# Patient Record
Sex: MALE | Race: WHITE | NOT HISPANIC OR LATINO | URBAN - METROPOLITAN AREA
[De-identification: names, ages, dates, MRNs, and addresses within clinical notes are randomized per-mention and may not be internally consistent; named-entity substitution may affect disease eponyms.]

---

## 2017-02-23 ENCOUNTER — EMERGENCY (EMERGENCY)
Facility: HOSPITAL | Age: 37
LOS: 0 days | Discharge: HOME | End: 2017-02-23
Attending: EMERGENCY MEDICINE
Payer: COMMERCIAL

## 2017-02-23 VITALS
DIASTOLIC BLOOD PRESSURE: 85 MMHG | HEART RATE: 117 BPM | SYSTOLIC BLOOD PRESSURE: 151 MMHG | HEIGHT: 69 IN | TEMPERATURE: 97 F | OXYGEN SATURATION: 99 % | WEIGHT: 203.05 LBS | RESPIRATION RATE: 18 BRPM

## 2017-02-23 DIAGNOSIS — E11.9 TYPE 2 DIABETES MELLITUS WITHOUT COMPLICATIONS: ICD-10-CM

## 2017-02-23 DIAGNOSIS — R10.9 UNSPECIFIED ABDOMINAL PAIN: ICD-10-CM

## 2017-02-23 DIAGNOSIS — M54.5 LOW BACK PAIN: ICD-10-CM

## 2017-02-23 DIAGNOSIS — K85.90 ACUTE PANCREATITIS WITHOUT NECROSIS OR INFECTION, UNSPECIFIED: ICD-10-CM

## 2017-02-23 LAB
APPEARANCE UR: CLEAR — SIGNIFICANT CHANGE UP
BILIRUB UR-MCNC: NEGATIVE — SIGNIFICANT CHANGE UP
COLOR SPEC: YELLOW — SIGNIFICANT CHANGE UP
DIFF PNL FLD: ABNORMAL
GLUCOSE UR QL: 1000 MG/DL
KETONES UR-MCNC: ABNORMAL
LEUKOCYTE ESTERASE UR-ACNC: NEGATIVE — SIGNIFICANT CHANGE UP
NITRITE UR-MCNC: NEGATIVE — SIGNIFICANT CHANGE UP
PH UR: 5 — SIGNIFICANT CHANGE UP (ref 4.8–8)
PROT UR-MCNC: 15 MG/DL
RBC CASTS # UR COMP ASSIST: ABNORMAL /HPF (ref 0–4)
SP GR SPEC: 1.01 — SIGNIFICANT CHANGE UP (ref 1.01–1.02)
UROBILINOGEN FLD QL: NEGATIVE MG/DL — SIGNIFICANT CHANGE UP
WBC UR QL: NEGATIVE — SIGNIFICANT CHANGE UP

## 2017-02-23 PROCEDURE — 99284 EMERGENCY DEPT VISIT MOD MDM: CPT | Mod: 25

## 2017-02-23 PROCEDURE — 74176 CT ABD & PELVIS W/O CONTRAST: CPT | Mod: 26

## 2017-02-23 RX ORDER — METFORMIN HYDROCHLORIDE 850 MG/1
1 TABLET ORAL
Qty: 20 | Refills: 0
Start: 2017-02-23 | End: 2017-03-05

## 2017-02-23 NOTE — ED PROVIDER NOTE - MEDICAL DECISION MAKING DETAILS
Patient with lumbago. Labs and imaging reviewed. Patient in good condition throughout ED course and now discharged with care instructions. Patient is to follow up with pmd. Discussed results and outcome of testing with the patient.  Patient advised to please follow up with their primary care doctor within the next 24 hours and return to the Emergency Department for worsening symptoms or any other concerns.  Patient advised that their doctor may call  to follow up on the specific results of the tests performed today in the emergency department. Patient with lumbago. Labs and imaging reviewed. Patient in good condition throughout ED course and now discharged with care instructions. He reports running out of metformin thus provided for him. Patient is to follow up with pmd. Discussed results and outcome of testing with the patient.  Patient advised to please follow up with their primary care doctor within the next 24 hours and return to the Emergency Department for worsening symptoms or any other concerns.  Patient advised that their doctor may call  to follow up on the specific results of the tests performed today in the emergency department.

## 2017-02-23 NOTE — ED ADULT TRIAGE NOTE - CHIEF COMPLAINT QUOTE
I started to urinate every hour this morning and then afterwards I would feel like pressure in the region of my kidneys.  im not in any pain at all.  I just kind of feel like my kidneys are inflammed.  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

## 2017-02-24 LAB
CULTURE RESULTS: NO GROWTH — SIGNIFICANT CHANGE UP
SPECIMEN SOURCE: SIGNIFICANT CHANGE UP

## 2021-01-02 DIAGNOSIS — Z20.828 CONTACT WITH AND (SUSPECTED) EXPOSURE TO OTHER VIRAL COMMUNICABLE DISEASES: ICD-10-CM

## 2021-11-05 ENCOUNTER — EMERGENCY (EMERGENCY)
Facility: HOSPITAL | Age: 41
LOS: 0 days | Discharge: HOME | End: 2021-11-05
Attending: STUDENT IN AN ORGANIZED HEALTH CARE EDUCATION/TRAINING PROGRAM
Payer: MEDICAID

## 2021-11-05 VITALS
RESPIRATION RATE: 16 BRPM | TEMPERATURE: 98 F | HEIGHT: 69 IN | OXYGEN SATURATION: 97 % | WEIGHT: 227.96 LBS | SYSTOLIC BLOOD PRESSURE: 138 MMHG | HEART RATE: 124 BPM | DIASTOLIC BLOOD PRESSURE: 85 MMHG

## 2021-11-05 VITALS
TEMPERATURE: 98 F | OXYGEN SATURATION: 99 % | DIASTOLIC BLOOD PRESSURE: 84 MMHG | SYSTOLIC BLOOD PRESSURE: 126 MMHG | HEART RATE: 91 BPM | RESPIRATION RATE: 18 BRPM

## 2021-11-05 DIAGNOSIS — J44.9 CHRONIC OBSTRUCTIVE PULMONARY DISEASE, UNSPECIFIED: ICD-10-CM

## 2021-11-05 DIAGNOSIS — R07.89 OTHER CHEST PAIN: ICD-10-CM

## 2021-11-05 DIAGNOSIS — F32.9 MAJOR DEPRESSIVE DISORDER, SINGLE EPISODE, UNSPECIFIED: ICD-10-CM

## 2021-11-05 DIAGNOSIS — E11.9 TYPE 2 DIABETES MELLITUS WITHOUT COMPLICATIONS: ICD-10-CM

## 2021-11-05 DIAGNOSIS — Z87.891 PERSONAL HISTORY OF NICOTINE DEPENDENCE: ICD-10-CM

## 2021-11-05 PROBLEM — K85.90 ACUTE PANCREATITIS WITHOUT NECROSIS OR INFECTION, UNSPECIFIED: Chronic | Status: ACTIVE | Noted: 2017-02-23

## 2021-11-05 LAB
ALBUMIN SERPL ELPH-MCNC: 4.2 G/DL — SIGNIFICANT CHANGE UP (ref 3.3–5)
ALP SERPL-CCNC: 74 U/L — SIGNIFICANT CHANGE UP (ref 40–120)
ALT FLD-CCNC: 30 U/L — SIGNIFICANT CHANGE UP (ref 12–78)
ANION GAP SERPL CALC-SCNC: 8 MMOL/L — SIGNIFICANT CHANGE UP (ref 5–17)
AST SERPL-CCNC: 9 U/L — LOW (ref 15–37)
BASOPHILS # BLD AUTO: 0.06 K/UL — SIGNIFICANT CHANGE UP (ref 0–0.2)
BASOPHILS NFR BLD AUTO: 0.4 % — SIGNIFICANT CHANGE UP (ref 0–2)
BILIRUB SERPL-MCNC: 0.6 MG/DL — SIGNIFICANT CHANGE UP (ref 0.2–1.2)
BUN SERPL-MCNC: 18 MG/DL — SIGNIFICANT CHANGE UP (ref 7–23)
CALCIUM SERPL-MCNC: 9.4 MG/DL — SIGNIFICANT CHANGE UP (ref 8.5–10.1)
CHLORIDE SERPL-SCNC: 101 MMOL/L — SIGNIFICANT CHANGE UP (ref 96–108)
CO2 SERPL-SCNC: 26 MMOL/L — SIGNIFICANT CHANGE UP (ref 22–31)
CREAT SERPL-MCNC: 0.72 MG/DL — SIGNIFICANT CHANGE UP (ref 0.5–1.3)
EOSINOPHIL # BLD AUTO: 0.04 K/UL — SIGNIFICANT CHANGE UP (ref 0–0.5)
EOSINOPHIL NFR BLD AUTO: 0.3 % — SIGNIFICANT CHANGE UP (ref 0–6)
FLUAV AG NPH QL: SIGNIFICANT CHANGE UP
FLUBV AG NPH QL: SIGNIFICANT CHANGE UP
GLUCOSE SERPL-MCNC: 208 MG/DL — HIGH (ref 70–99)
HCT VFR BLD CALC: 45.1 % — SIGNIFICANT CHANGE UP (ref 39–50)
HGB BLD-MCNC: 15.6 G/DL — SIGNIFICANT CHANGE UP (ref 13–17)
IMM GRANULOCYTES NFR BLD AUTO: 0.4 % — SIGNIFICANT CHANGE UP (ref 0–1.5)
LYMPHOCYTES # BLD AUTO: 20.2 % — SIGNIFICANT CHANGE UP (ref 13–44)
LYMPHOCYTES # BLD AUTO: 3.11 K/UL — SIGNIFICANT CHANGE UP (ref 1–3.3)
MCHC RBC-ENTMCNC: 27.6 PG — SIGNIFICANT CHANGE UP (ref 27–34)
MCHC RBC-ENTMCNC: 34.6 GM/DL — SIGNIFICANT CHANGE UP (ref 32–36)
MCV RBC AUTO: 79.8 FL — LOW (ref 80–100)
MONOCYTES # BLD AUTO: 0.94 K/UL — HIGH (ref 0–0.9)
MONOCYTES NFR BLD AUTO: 6.1 % — SIGNIFICANT CHANGE UP (ref 2–14)
NEUTROPHILS # BLD AUTO: 11.19 K/UL — HIGH (ref 1.8–7.4)
NEUTROPHILS NFR BLD AUTO: 72.6 % — SIGNIFICANT CHANGE UP (ref 43–77)
NRBC # BLD: 0 /100 WBCS — SIGNIFICANT CHANGE UP (ref 0–0)
PLATELET # BLD AUTO: 291 K/UL — SIGNIFICANT CHANGE UP (ref 150–400)
POTASSIUM SERPL-MCNC: 3.7 MMOL/L — SIGNIFICANT CHANGE UP (ref 3.5–5.3)
POTASSIUM SERPL-SCNC: 3.7 MMOL/L — SIGNIFICANT CHANGE UP (ref 3.5–5.3)
PROT SERPL-MCNC: 8.2 GM/DL — SIGNIFICANT CHANGE UP (ref 6–8.3)
RBC # BLD: 5.65 M/UL — SIGNIFICANT CHANGE UP (ref 4.2–5.8)
RBC # FLD: 12.5 % — SIGNIFICANT CHANGE UP (ref 10.3–14.5)
SARS-COV-2 RNA SPEC QL NAA+PROBE: SIGNIFICANT CHANGE UP
SODIUM SERPL-SCNC: 135 MMOL/L — SIGNIFICANT CHANGE UP (ref 135–145)
TROPONIN I, HIGH SENSITIVITY RESULT: 5.5 NG/L — SIGNIFICANT CHANGE UP
TROPONIN I, HIGH SENSITIVITY RESULT: 5.7 NG/L — SIGNIFICANT CHANGE UP
WBC # BLD: 15.4 K/UL — HIGH (ref 3.8–10.5)
WBC # FLD AUTO: 15.4 K/UL — HIGH (ref 3.8–10.5)

## 2021-11-05 PROCEDURE — 99285 EMERGENCY DEPT VISIT HI MDM: CPT

## 2021-11-05 PROCEDURE — 71046 X-RAY EXAM CHEST 2 VIEWS: CPT | Mod: 26

## 2021-11-05 PROCEDURE — 93010 ELECTROCARDIOGRAM REPORT: CPT

## 2021-11-05 NOTE — ED ADULT TRIAGE NOTE - CHIEF COMPLAINT QUOTE
pt states he has anxiety Dad  2 months ago and unemployed etc c/o today of left chest discomfort after stopping smoking 5 days ago

## 2021-11-05 NOTE — ED PROVIDER NOTE - OBJECTIVE STATEMENT
40 yo male with PMH DM, COPD presents to ED for evaluation chest pain. Pain is left sided, tightness, radiating to L jaw. States onset of pain intermittent since 2 months ago when father , quit his job. Reports he was going through severe depression, did not seek medical attention. States earlier this week, began to feel more motivated to self care, saw a PCP, was prescribed Lexapro. Denies shortness of breath, nausea, vomiting, diarrhea.   Psx: none  Allergies: none  Has been vaccinated for covid. Denies recent travel or sick contacts.   Former smoking, none EtOH use,+ THC

## 2021-11-05 NOTE — ED PROVIDER NOTE - NSFOLLOWUPINSTRUCTIONS_ED_ALL_ED_FT
Please return to Emergency Department immediately for any new, concerning, or worsening symptoms.   Please follow-up with Cardiology as recommended.    Please take prescriptions as discussed.

## 2021-11-05 NOTE — ED ADULT NURSE NOTE - DOES PATIENT HAVE ADVANCE DIRECTIVE
----- Message from Hilary Salgado sent at 2/13/2017  1:27 PM EST -----  Contact: PATIENT  RE: CIALIS 5 MG    PATIENT CALLED STATING HIS INSURANCE HAS CHANGED AND THAT HE NOW NEEDS A PA FOR CIALIS. HE HAS REFILLS REMAINING BUT CAN NOT PICK THEM UP PER INSURANCE NEEDING A PA.    [facility] IN [place], [place]    CALL BACK #[phone]   No

## 2021-11-05 NOTE — ED PROVIDER NOTE - NSICDXFAMILYHX_GEN_ALL_CORE_FT
FAMILY HISTORY:  No pertinent family history in first degree relatives     Star Wedge Flap Text: The defect edges were debeveled with a #15 scalpel blade.  Given the location of the defect, shape of the defect and the proximity to free margins a star wedge flap was deemed most appropriate.  Using a sterile surgical marker, an appropriate rotation flap was drawn incorporating the defect and placing the expected incisions within the relaxed skin tension lines where possible. The area thus outlined was incised deep to adipose tissue with a #15 scalpel blade.  The skin margins were undermined to an appropriate distance in all directions utilizing iris scissors.

## 2021-11-05 NOTE — ED PROVIDER NOTE - PATIENT PORTAL LINK FT
You can access the FollowMyHealth Patient Portal offered by [facility] by registering at the following website: [email or web address]. By joining [employer]’s FollowMyHealth portal, you will also be able to view your health information using other applications (apps) compatible with our system.

## 2021-11-05 NOTE — ED ADULT NURSE NOTE - OBJECTIVE STATEMENT
PT presented to ER, aOX4 and ambulatory, with complaints of left sided chest tightness. pt states his father passed away two months ago due to COVID, lost his job and has been overly stressed out. Pt admits to quitting marijuana five days ago and being evaluated by PMD for anxiety. PT states that he was prescribed medication for anxiety but unable

## 2021-11-05 NOTE — ED PROVIDER NOTE - NS ED ROS FT
Constitutional: no fevers or chills  Cardiac: no palpitations, +chest pain  Lungs: no shortness of breath, wheezes  Abd: no abd pain, nausea, vomiting, diarrhea  Genitourinary: no dysuria, increased urinary frequency, hematuria  Neurology: no sensorimotor deficits, no dizziness, no headache, no visual changes  Skin: no rashes  All other ROS negative except as per HPI

## 2023-05-16 ENCOUNTER — APPOINTMENT (OUTPATIENT)
Dept: ORTHOPEDIC SURGERY | Facility: CLINIC | Age: 43
End: 2023-05-16
Payer: MEDICAID

## 2023-05-16 VITALS — BODY MASS INDEX: 32.14 KG/M2 | HEIGHT: 69 IN | WEIGHT: 217 LBS

## 2023-05-16 DIAGNOSIS — I10 ESSENTIAL (PRIMARY) HYPERTENSION: ICD-10-CM

## 2023-05-16 DIAGNOSIS — S16.1XXA STRAIN OF MUSCLE, FASCIA AND TENDON AT NECK LEVEL, INITIAL ENCOUNTER: ICD-10-CM

## 2023-05-16 DIAGNOSIS — Z87.891 PERSONAL HISTORY OF NICOTINE DEPENDENCE: ICD-10-CM

## 2023-05-16 DIAGNOSIS — E78.00 PURE HYPERCHOLESTEROLEMIA, UNSPECIFIED: ICD-10-CM

## 2023-05-16 DIAGNOSIS — E11.9 TYPE 2 DIABETES MELLITUS W/OUT COMPLICATIONS: ICD-10-CM

## 2023-05-16 DIAGNOSIS — M25.812 OTHER SPECIFIED JOINT DISORDERS, LEFT SHOULDER: ICD-10-CM

## 2023-05-16 PROCEDURE — 99204 OFFICE O/P NEW MOD 45 MIN: CPT

## 2023-05-16 PROCEDURE — 72040 X-RAY EXAM NECK SPINE 2-3 VW: CPT

## 2023-05-16 PROCEDURE — 73030 X-RAY EXAM OF SHOULDER: CPT | Mod: LT

## 2023-05-16 RX ORDER — INSULIN GLARGINE 100 [IU]/ML
INJECTION, SOLUTION SUBCUTANEOUS
Refills: 0 | Status: ACTIVE | COMMUNITY

## 2023-05-16 RX ORDER — LOSARTAN POTASSIUM 50 MG/1
50 TABLET, FILM COATED ORAL
Refills: 0 | Status: ACTIVE | COMMUNITY

## 2023-05-16 RX ORDER — ATORVASTATIN CALCIUM 40 MG/1
40 TABLET, FILM COATED ORAL
Refills: 0 | Status: ACTIVE | COMMUNITY

## 2023-05-16 RX ORDER — SITAGLIPTIN AND METFORMIN HYDROCHLORIDE 50; 1000 MG/1; MG/1
50-1000 TABLET, FILM COATED, EXTENDED RELEASE ORAL
Refills: 0 | Status: ACTIVE | COMMUNITY

## 2023-05-16 NOTE — IMAGING
[de-identified] : Left Shoulder Exam: ER at 0 is 45 degrees. GH abduction is 75 degrees. Adduction range is full. Neer's test is negative. There is no internal impingement sign. IR range is thumb to mid thoracic with substantial pain. There is a positive external impingement sign. The forearm rotates to 10 degrees short of vertical. ER strength is excellent. IR strength is excellent. Empty can test reveals excellent strength with slight pain.\par \par Cervical Spine Exam: There is full AROM of all modes of motion. There is no pain at the extremes. DTRs are triceps 1+ equal, biceps 0 equal. No tenderness over the left occipital muscle. There is slight tenderness over the SCM 3 centimeters distal from the insertion on the mastoid. \par \par Left Shoulder X-Rays: There is a type 1-2 acromion. Slight DJD of the GH joint. No superior migration. \par \par Cervical Spine X-Rays:  There is partial straightening of lordosis. Advanced narrowing of all discs with early reactive change. There is minimal facet arthrosis. No lesions or fractures.

## 2023-05-16 NOTE — HISTORY OF PRESENT ILLNESS
[FreeTextEntry1] : Left Shoulder  [de-identified] : 05/16/2023: Left shoulder\par Pt is RHD. Pt reports that he has been experiencing left shoulder pain for 8 years, and has previously gotten a CSI to the shoulder which helped his pain and lasted for about 6 months. He states that he has a hx of diabetes and in the past had not taken care of his sugar levels, which he has started to attend to recently. Since monitoring and managing his sugars he states that his pain has greatly reduced but not entirely resolved. He reports that he started to experience neck pain on and off throughout the years alongside the shoulder pain. He makes mention that when placing his left hand on his head, the pain intensity reduces. He has pain when extending his arm backwards. He also makes note of weakness present in the left arm- he states that he cannot lift a gallon of milk from the top shelf of his fridge without supporting the arm.  [FreeTextEntry5] : Pt denies injury/accident.  PT states he has left shoulder pain for over 8 years.  PT states he received cortisone injections from his primary care physician and it helped.  The last cortisone shot helped for 6 months which was given 10/01/2022.

## 2023-05-16 NOTE — DISCUSSION/SUMMARY
[de-identified] : 1) I discussed the risks, benefits and alternatives of treatment options for the cervical spine and left shoulder, including activity modification, rest, home exercise, oral antiinflammatory medication, physical therapy, CSI. \par 2) **Rx physical therapy for the left shoulder for AROM restoration.\par 3) Pt will continue with activity modification and home exercise as tolerated.  The patient should avoid exercise and activity that aggravates pain. \par \par \par The patient will continue with conservative treatment as described above, and will F/U in 6 weeks. \par \par \par The patient was advised of the diagnosis.  The natural history of the pathology was explained in full to the patient in layman's terms, including but not limited to the risks, symptoms and available options for treatment.  We discussed the risks, benefits and alternatives of the treatment options and the advice I provided to the patient as listed above.  Pt was given the opportunity to ask questions, and all questions were answered.  The discussion was not limited to the above.\par \par Entered by Alicia Vergara acting as scribe.

## 2023-06-27 ENCOUNTER — APPOINTMENT (OUTPATIENT)
Dept: ORTHOPEDIC SURGERY | Facility: CLINIC | Age: 43
End: 2023-06-27

## 2024-02-23 NOTE — ED ADULT NURSE NOTE - GASTROINTESTINAL WDL
Abdomen soft, nontender, nondistended, bowel sounds present in all 4 quadrants.
Detail Level: Zone
Topical Steroids Counseling: I discussed with the patient that prolonged use of topical steroids can result in the increased appearance of superficial blood vessels (telangiectasias), lightening (hypopigmentation) and thinning of the skin (atrophy).  Patient understands to avoid using high potency steroids in skin folds, the groin or the face.  The patient verbalized understanding of the proper use and possible adverse effects of topical steroids.  All of the patient's questions and concerns were addressed.

## 2024-04-27 ENCOUNTER — EMERGENCY (EMERGENCY)
Facility: HOSPITAL | Age: 44
LOS: 0 days | Discharge: HOME | End: 2024-04-27
Attending: STUDENT IN AN ORGANIZED HEALTH CARE EDUCATION/TRAINING PROGRAM
Payer: MEDICAID

## 2024-04-27 VITALS
TEMPERATURE: 98 F | RESPIRATION RATE: 19 BRPM | HEIGHT: 69 IN | HEART RATE: 101 BPM | WEIGHT: 235.01 LBS | SYSTOLIC BLOOD PRESSURE: 183 MMHG | OXYGEN SATURATION: 98 % | DIASTOLIC BLOOD PRESSURE: 80 MMHG

## 2024-04-27 DIAGNOSIS — R07.89 OTHER CHEST PAIN: ICD-10-CM

## 2024-04-27 DIAGNOSIS — E11.9 TYPE 2 DIABETES MELLITUS WITHOUT COMPLICATIONS: ICD-10-CM

## 2024-04-27 DIAGNOSIS — I10 ESSENTIAL (PRIMARY) HYPERTENSION: ICD-10-CM

## 2024-04-27 DIAGNOSIS — F41.9 ANXIETY DISORDER, UNSPECIFIED: ICD-10-CM

## 2024-04-27 DIAGNOSIS — Z87.891 PERSONAL HISTORY OF NICOTINE DEPENDENCE: ICD-10-CM

## 2024-04-27 DIAGNOSIS — E78.5 HYPERLIPIDEMIA, UNSPECIFIED: ICD-10-CM

## 2024-04-27 LAB
ALBUMIN SERPL ELPH-MCNC: 3.8 G/DL — SIGNIFICANT CHANGE UP (ref 3.3–5)
ALP SERPL-CCNC: 67 U/L — SIGNIFICANT CHANGE UP (ref 40–120)
ALT FLD-CCNC: 34 U/L — SIGNIFICANT CHANGE UP (ref 12–78)
ANION GAP SERPL CALC-SCNC: 10 MMOL/L — SIGNIFICANT CHANGE UP (ref 5–17)
APTT BLD: 43.6 SEC — HIGH (ref 24.5–35.6)
AST SERPL-CCNC: 17 U/L — SIGNIFICANT CHANGE UP (ref 15–37)
BASOPHILS # BLD AUTO: 0.04 K/UL — SIGNIFICANT CHANGE UP (ref 0–0.2)
BASOPHILS NFR BLD AUTO: 0.4 % — SIGNIFICANT CHANGE UP (ref 0–2)
BILIRUB SERPL-MCNC: 0.5 MG/DL — SIGNIFICANT CHANGE UP (ref 0.2–1.2)
BUN SERPL-MCNC: 19 MG/DL — SIGNIFICANT CHANGE UP (ref 7–23)
CALCIUM SERPL-MCNC: 9.2 MG/DL — SIGNIFICANT CHANGE UP (ref 8.5–10.1)
CHLORIDE SERPL-SCNC: 103 MMOL/L — SIGNIFICANT CHANGE UP (ref 96–108)
CO2 SERPL-SCNC: 25 MMOL/L — SIGNIFICANT CHANGE UP (ref 22–31)
CREAT SERPL-MCNC: 0.72 MG/DL — SIGNIFICANT CHANGE UP (ref 0.5–1.3)
EGFR: 116 ML/MIN/1.73M2 — SIGNIFICANT CHANGE UP
EOSINOPHIL # BLD AUTO: 0.05 K/UL — SIGNIFICANT CHANGE UP (ref 0–0.5)
EOSINOPHIL NFR BLD AUTO: 0.5 % — SIGNIFICANT CHANGE UP (ref 0–6)
GLUCOSE SERPL-MCNC: 120 MG/DL — HIGH (ref 70–99)
HCT VFR BLD CALC: 47 % — SIGNIFICANT CHANGE UP (ref 39–50)
HGB BLD-MCNC: 16 G/DL — SIGNIFICANT CHANGE UP (ref 13–17)
IMM GRANULOCYTES NFR BLD AUTO: 0.6 % — SIGNIFICANT CHANGE UP (ref 0–0.9)
INR BLD: 0.86 RATIO — SIGNIFICANT CHANGE UP (ref 0.85–1.18)
LIDOCAIN IGE QN: 38 U/L — SIGNIFICANT CHANGE UP (ref 13–75)
LYMPHOCYTES # BLD AUTO: 2.66 K/UL — SIGNIFICANT CHANGE UP (ref 1–3.3)
LYMPHOCYTES # BLD AUTO: 26.2 % — SIGNIFICANT CHANGE UP (ref 13–44)
MAGNESIUM SERPL-MCNC: 2.1 MG/DL — SIGNIFICANT CHANGE UP (ref 1.6–2.6)
MCHC RBC-ENTMCNC: 27.1 PG — SIGNIFICANT CHANGE UP (ref 27–34)
MCHC RBC-ENTMCNC: 34 G/DL — SIGNIFICANT CHANGE UP (ref 32–36)
MCV RBC AUTO: 79.5 FL — LOW (ref 80–100)
MONOCYTES # BLD AUTO: 0.69 K/UL — SIGNIFICANT CHANGE UP (ref 0–0.9)
MONOCYTES NFR BLD AUTO: 6.8 % — SIGNIFICANT CHANGE UP (ref 2–14)
NEUTROPHILS # BLD AUTO: 6.67 K/UL — SIGNIFICANT CHANGE UP (ref 1.8–7.4)
NEUTROPHILS NFR BLD AUTO: 65.5 % — SIGNIFICANT CHANGE UP (ref 43–77)
NRBC # BLD: 0 /100 WBCS — SIGNIFICANT CHANGE UP (ref 0–0)
NT-PROBNP SERPL-SCNC: 16 PG/ML — SIGNIFICANT CHANGE UP (ref 0–125)
PLATELET # BLD AUTO: 221 K/UL — SIGNIFICANT CHANGE UP (ref 150–400)
POTASSIUM SERPL-MCNC: 3.4 MMOL/L — LOW (ref 3.5–5.3)
POTASSIUM SERPL-SCNC: 3.4 MMOL/L — LOW (ref 3.5–5.3)
PROT SERPL-MCNC: 7.9 GM/DL — SIGNIFICANT CHANGE UP (ref 6–8.3)
PROTHROM AB SERPL-ACNC: 10.4 SEC — SIGNIFICANT CHANGE UP (ref 9.5–13)
RBC # BLD: 5.91 M/UL — HIGH (ref 4.2–5.8)
RBC # FLD: 13 % — SIGNIFICANT CHANGE UP (ref 10.3–14.5)
SODIUM SERPL-SCNC: 138 MMOL/L — SIGNIFICANT CHANGE UP (ref 135–145)
TROPONIN I, HIGH SENSITIVITY RESULT: 4.4 NG/L — SIGNIFICANT CHANGE UP
WBC # BLD: 10.17 K/UL — SIGNIFICANT CHANGE UP (ref 3.8–10.5)
WBC # FLD AUTO: 10.17 K/UL — SIGNIFICANT CHANGE UP (ref 3.8–10.5)

## 2024-04-27 PROCEDURE — 71046 X-RAY EXAM CHEST 2 VIEWS: CPT | Mod: 26

## 2024-04-27 PROCEDURE — 93010 ELECTROCARDIOGRAM REPORT: CPT

## 2024-04-27 PROCEDURE — 99285 EMERGENCY DEPT VISIT HI MDM: CPT

## 2024-04-27 NOTE — ED ADULT TRIAGE NOTE - CHIEF COMPLAINT QUOTE
Tightness in chest today- On and off past fews weeks. Also c/o heart racing- checks himself at home  HX anxiety, DM, quit edibles last month

## 2024-04-27 NOTE — ED PROVIDER NOTE - NSICDXPASTMEDICALHX_GEN_ALL_CORE_FT
PAST MEDICAL HISTORY:  Diabetes     HLD (hyperlipidemia)     HTN (hypertension)     Pancreatitis

## 2024-04-27 NOTE — ED PROVIDER NOTE - PHYSICAL EXAMINATION
Gen: aox3, nad   Head: NCAT  ENT: Airway patent, moist mucous membranes, nasal passageways clear  Cardiac: Normal rate, normal rhythm   Respiratory: Lungs CTA B/L  Gastrointestinal: Abdomen soft, nontender, nondistended, no rebound, no guarding  MSK: No gross abnormalities, FROM of all four extremities, no edema  HEME: Extremities warm, radial pulses intact and symmetrical   Skin: No rashes, no lesions  Neuro: No gross neurologic deficits, normal speech, no gait abnormality

## 2024-04-27 NOTE — ED PROVIDER NOTE - CLINICAL SUMMARY MEDICAL DECISION MAKING FREE TEXT BOX
44M pmhx dm, htn, hld, anxiety, former marijuana use, who presents today for left localized anterior chest tightness with associated palpitations/ heart racing sensation - lasting approx 2-3 minutes while he was seated in a room where others were smoking, completely self resolved without active symptoms   -  no exertional chest pain, no associated sob, lungs clear on exam, cxr ordered to rule out effusion, masses, consolidation, pneumothorax   - ekg with st depressions but when compared to ekg from 2021 it is unchanged, pt also without any chest pain - check troponin eval for nstemi, labs eval for anemia/ electrolyte derangements   - discussed w/ pt repeat serial troponins and re-eval but pt does not want to stay longer in the ED, shared decision making had and pt would like to be discharged. Initial troponin negative, atypical chest pain, pt given strict return precautions and advised urgent outpatient cardiology follow up

## 2024-04-27 NOTE — ED PROVIDER NOTE - NSFOLLOWUPINSTRUCTIONS_ED_ALL_ED_FT
You were sen today for chest pain - you did not have abnormal heart enzyme but you did have an abnormal EKG - this is similar to the one from 2021 but you should follow up urgently with cardiology and return for any new chest pain     Chest Pain    Chest pain can be caused by many different conditions which may or may not be dangerous. Causes include heartburn, lung infections, heart attack, blood clot in lungs, skin infections, strain or damage to muscle, cartilage, or bones, etc. In addition to a history and physical examination, an electrocardiogram (ECG) or other lab tests may have been performed to determine the cause of your chest pain. Follow up with your primary care provider or with a cardiologist as instructed.     SEEK IMMEDIATE MEDICAL CARE IF YOU HAVE ANY OF THE FOLLOWING SYMPTOMS: worsening chest pain, coughing up blood, unexplained back/neck/jaw pain, severe abdominal pain, dizziness or lightheadedness, fainting, shortness of breath, sweaty or clammy skin, vomiting, or racing heart beat. These symptoms may represent a serious problem that is an emergency. Do not wait to see if the symptoms will go away. Get medical help right away. Call 911 and do not drive yourself to the hospital.

## 2024-04-27 NOTE — ED PROVIDER NOTE - CARE PROVIDER_API CALL
Nish Peters  Interventional Cardiology  [address] [place] [address]  Phone: [phone]  Fax: [phone]  Follow Up Time:

## 2024-04-27 NOTE — ED PROVIDER NOTE - PATIENT PORTAL LINK FT
You can access the FollowMyHealth Patient Portal offered by [facility] by registering at the following website: [email or web address]. By joining [employer]’s FollowMyHealth portal, you will also be able to view your health information using other applications (apps) compatible with our system.

## 2024-04-27 NOTE — ED ADULT NURSE NOTE - OBJECTIVE STATEMENT
pt presents to the ed c/o chest discomfrot. States that for the past few weeks he's been havung intermittent chest tightness. Denies spb/ chest pain, n/v, headache, dizziness. States that he's really stressed out at home and at work and feels that may be triggering. 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 HX HTn, DM, HLD, compliant with meds

## 2024-04-27 NOTE — ED PROVIDER NOTE - DISCHARGE DATE
Quality 431: Preventive Care And Screening: Unhealthy Alcohol Use - Screening: Patient not identified as an unhealthy alcohol user when screened for unhealthy alcohol use using a systematic screening method Quality 130: Documentation Of Current Medications In The Medical Record: Current Medications Documented Detail Level: Detailed Quality 226: Preventive Care And Screening: Tobacco Use: Screening And Cessation Intervention: Patient screened for tobacco use and is an ex/non-smoker 27-Apr-2024

## 2024-04-28 ENCOUNTER — TRANSCRIPTION ENCOUNTER (OUTPATIENT)
Age: 44
End: 2024-04-28

## 2024-07-11 NOTE — ED PROVIDER NOTE - OBJECTIVE STATEMENT
Abhijit describes a subacute change in bowel habits over the past 8 months.  He is not having fevers or chills, losing weight or having blood in his stool.  That is reassuring.  His symptoms do correlate with increased alcohol intake, he is going to begin to curb his drinking.  He is going to limit drinking to 1 to 2 days/week and only 1-2 drinks per day.  Comprehensive metabolic panel, CBC, tissue transglutaminase antibodies will be included with his blood work.  He is going to observe a strict lactose-free diet.  Colon cancer screening is due now, and a colonoscopy was ordered.  He will call if his symptoms do not respond to diet changes.   Pertinent PMH/PSH/FHx/SHx and Review of Systems contained within:  37 yo m with PMH of DM presents in ED c/o lower back pain associated with frequent urination. Patient reports he is  and is sitting in uncomfortable position for long periods of time. No fever/chills, No photophobia/eye pain/changes in vision, No ear pain/sore throat/dysphagia, No chest pain/palpitations, no SOB/cough/wheeze/stridor, No abdominal pain, No N/V/D, no dysuria/frequency/discharge, No neck pain, no rash, no changes in neurological status/function.

## 2024-12-01 NOTE — ED PROVIDER NOTE - INTERNATIONAL TRAVEL
Today's date 12/1/2024  Admission date 11/20/2024  Hospital Room /A   Referring Provider: Yamini White NP     Reason for initial visit:  Evaluation and management of Diabetes Mellitus - type II, uncontrolled     History of Present Illness:  Current history is provided by The patient and The chart  This is a 59 year old Male who is admitted for CABG 11/27/2024.  He received 4 mg of Decadron IntraOp.    Interval History/ROS:  Appetite is fair   Some nausea.       Current hospital regimen for diabetes: Humalog 16 units post meals while on insulin infusion and eating > 50% of his meals.   Lantus 40 units daily  Insulin infusion with current rate at 0.8 units/hr.    --> of note, prior to CABG, he was managed with Jardiance 10 mg daily, Lantus 40 units daily, and sliding scale per primary team    Current oral intake status Room Service Continuous  Consistent Carb Moderate (45-75 Gm/Meal); Fluid Restrict 1800ml (1020 From Dietary) Diet    Blood sugars while hospitalized    Recent Labs   Lab 11/30/24  0639 11/30/24  0753 11/30/24  1151 11/30/24  1404 11/30/24  1758 11/30/24  2009 11/30/24  2108 11/30/24  2157 11/30/24  2257 11/30/24  2357 12/01/24  0057 12/01/24  0119 12/01/24  0206 12/01/24  0253 12/01/24  0401 12/01/24  0504 12/01/24  0610   GLUCOSE BEDSIDE 131* 128* 157* 182* 78 196* 174* 133* 104* 78 54* 113* 89 109* 110* 118* 138*     Patient has Diabetes Mellitus - type II, uncontrolled. Patient was diagnosed about 10 years ago when he was admitted for blood sugar over 600 which he attributes to accidentally receiving 2 doses of the flu vaccine that year.  He denies being in DKA.. Home regimen includes Synjardy  mg daily, Soliqua 60 units daily.  Patient checks blood sugars 2-3 times per day at home. Blood sugars at home range less than 160 fasting, 100s not fasting with rare sugars in low 200s.. Patient was having very rare hypoglycemic episodes at home. Patient does have good hypoglycemic awareness.      Patient Has not been hospitalized or utilized emergency services for blood sugars in the recent past. Patient sees PCP for diabetes management as an outpatient.    Diabetes complications include:   Retinopathy -denies. Last dilated eye [id number].  Nephropathy- microalbuminuria  Neuropathy- None.      History:   Past medical history, surgical history, medications, allergies, family history and social history reviewed and in Epic.    Physical Exam:  Visit Vitals  BP (!) 149/76   Pulse 91   Temp 98.5 °F (36.9 °C) (Oral)   Resp (!) 33   Ht 5' 11\" (1.803 m)   Wt 104.9 kg (231 lb 4.2 oz)   SpO2 93%   BMI 32.25 kg/m²   Constitutional: Resting in chair, tired appearing male and in NAD.  Eyes:  wearing glasses.   Head: Normocephalic, atraumatic  Neck: Supple, R neck IV in place.   Respiratory: no accessory muscle use.   Cardiac:no LE edema bilaterally.    : davis in place  Skin: Warm and dry, no visible rashes.    Psych: Normal mood and flat affect.  Musculoskeletal: SETHI x4, no deformities  Neuro: Awake, alert. No tremors.     Lab Review:  Last Lab A1C:  Hemoglobin A1C (%)   Date Value   11/20/2024 6.6 (H)     Last Point of Care A1C:  No results found for: \"CWBZRSGZ0C\"  No components found for: \"CHOLHDL\"  Triglycerides (mg/dL)   Date Value   11/20/2024 57     HDL (mg/dL)   Date Value   11/20/2024 37 (L)     LDL (mg/dL)   Date Value   11/20/2024 76     No components found for: \"NONHDLCALC\"  Sodium (mmol/L)   Date Value   12/01/2024 139     Potassium (mmol/L)   Date Value   12/01/2024 3.3 (L)     Chloride (mmol/L)   Date Value   12/01/2024 101     Glucose (mg/dL)   Date Value   12/01/2024 109 (H)     Calcium (mg/dL)   Date Value   12/01/2024 8.8     Carbon Dioxide (mmol/L)   Date Value   12/01/2024 31     BUN (mg/dL)   Date Value   12/01/2024 16     Glomerular Filtration Rate (no units)   Date Value   12/01/2024 >90     Creatinine (mg/dL)   Date Value   12/01/2024 0.64 (L)     TSH (mcUnits/mL)   Date Value   11/24/2024  1.393     WBC (K/mcL)   Date Value   12/01/2024 13.3 (H)     RBC (mil/mcL)   Date Value   12/01/2024 3.81 (L)     HCT (%)   Date Value   12/01/2024 34.8 (L)     HGB (g/dL)   Date Value   12/01/2024 11.7 (L)     PLT (K/mcL)   Date Value   12/01/2024 176     GOT/AST (Units/L)   Date Value   11/30/2024 36     Alkaline Phosphatase (Units/L)   Date Value   11/30/2024 62     Bilirubin, Total (mg/dL)   Date Value   11/30/2024 0.9       Assessment:   - Type 2 diabetes mellitus with hyperglycemia, impacted by IntraOp steroid use  - On continuous insulin infusion.   - s/p CABG 11/27/24  - Microvascular complications: Microalbuminuria  - Macrovascular complications: CAD  - Hypertension: is not on ACE/ARB PTA   - Hyperlipidemia: is  on statin PTA   - Obesity Body mass index is 32.25 kg/m².       Plan:  BG trends reviewed.  Continue with insulin infusion till noon time.   Continue with Lantus 40 units daily.   Change Humalog from 14 to 16 units. Add med correctional insulin starting at lunch time.   Add humalog correctional insulin at hs.   Continue hypoglycemia protocol PRN   Continue consistent carb diet   Continue to check BG levels per protocol.    Hossein is ready for discharge from my viewpoint: No  Workup needed prior to discharge:  Blood sugar optimization  Medications changes at discharge:   basal-bolus insulin, but not yet finalized. He is at his max dose on his Soliquia PTA, and  this may not be enough to allow for strict glycemic control in the post-op period  Follow up appointments needed after discharge:   TBD    Estimated Date of Discharge documented: 12/3/2024      We will continue to follow and titrate/manage medications as appropriate.              No